# Patient Record
(demographics unavailable — no encounter records)

---

## 2024-10-08 NOTE — HEALTH RISK ASSESSMENT
[Yes] : Yes [Monthly or less (1 pt)] : Monthly or less (1 point) [1 or 2 (0 pts)] : 1 or 2 (0 points) [Never (0 pts)] : Never (0 points) [No] : In the past 12 months have you used drugs other than those required for medical reasons? No [0] : 2) Feeling down, depressed, or hopeless: Not at all (0) [PHQ-2 Negative - No further assessment needed] : PHQ-2 Negative - No further assessment needed [Never] : Never [NO] : No [Patient declined Low Dose CT Scan] : Patient declined Low Dose CT Scan [Patient declined Retinal Exam] : Patient declined Retinal Exam. [HIV test declined] : HIV test declined [Hepatitis C test declined] : Hepatitis C test declined [Audit-CScore] : 1 [[id number]] : 0

## 2024-10-08 NOTE — PHYSICAL EXAM
[No Acute Distress] : no acute distress [Well Nourished] : well nourished [Well Developed] : well developed [Well-Appearing] : well-appearing [Normal Sclera/Conjunctiva] : normal sclera/conjunctiva [PERRL] : pupils equal round and reactive to light [EOMI] : extraocular movements intact [Normal Outer Ear/Nose] : the outer ears and nose were normal in appearance [Normal Oropharynx] : the oropharynx was normal [No JVD] : no jugular venous distention [No Lymphadenopathy] : no lymphadenopathy [Supple] : supple [No Respiratory Distress] : no respiratory distress  [No Accessory Muscle Use] : no accessory muscle use [Clear to Auscultation] : lungs were clear to auscultation bilaterally [Normal Rate] : normal rate  [Regular Rhythm] : with a regular rhythm [Normal S1, S2] : normal S1 and S2 [No Carotid Bruits] : no carotid bruits [No Abdominal Bruit] : a ~M bruit was not heard ~T in the abdomen [No Varicosities] : no varicosities [No Edema] : there was no peripheral edema [No Palpable Aorta] : no palpable aorta [No Extremity Clubbing/Cyanosis] : no extremity clubbing/cyanosis [Soft] : abdomen soft [Non Tender] : non-tender [Non-distended] : non-distended [No HSM] : no HSM [Normal Bowel Sounds] : normal bowel sounds [No CVA Tenderness] : no CVA  tenderness [No Spinal Tenderness] : no spinal tenderness [No Joint Swelling] : no joint swelling [Grossly Normal Strength/Tone] : grossly normal strength/tone [No Rash] : no rash [Coordination Grossly Intact] : coordination grossly intact [No Focal Deficits] : no focal deficits [Normal Gait] : normal gait [Normal Affect] : the affect was normal [Normal Insight/Judgement] : insight and judgment were intact [de-identified] : right inguina hernia

## 2024-10-08 NOTE — PLAN
[FreeTextEntry1] : In regards to patients Physical exam, routine blood work drawn, will review results with patient.  Snoring/deviated septum- patient was referred to ENT  DDD cervical spine- patient will continue to see spine specialist   History of transaminitis- patient has no gone for US liver, will re-order test and call patient with results Patient will continue to follow up with Gastroenterologist.   Patient will follow with his cardiologist Dr. Glez.   Counseling included abnormal lab results, differential diagnoses, treatment options, risks and benefits, lifestyle changes, current condition, medications, and dose adjustments.  The patient was interactive, attentive, asked questions, and verbalized understanding

## 2024-10-08 NOTE — HISTORY OF PRESENT ILLNESS
[de-identified] : Mr. WEST MORFIN is a 41 year male with a PMH of abdominal hernia repair comes to the office for physical exam. Patient denies fever, cough SOB. No other complaints at this time.  [FreeTextEntry1] : physical exam

## 2024-10-14 NOTE — HISTORY OF PRESENT ILLNESS
[de-identified] : 41 year male comes in today with snoring.  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  Also has young children with poor sleeping schedules.  Wife observes loud snoring.  Patient fatigued for long time.  Congestion in nose, longstanding history of allergies, possible DNS.  Questionable improvement with flonase.  Denies epistaxis, sinus infections, sinus pressure.

## 2024-10-14 NOTE — PHYSICAL EXAM
[] : septum deviated to the left [de-identified] : ITH and congested nasal mucosa. [Midline] : trachea located in midline position [de-identified] : 1-2+ tonsils without erythema or exudates [Normal] : no neck adenopathy

## 2024-10-14 NOTE — CONSULT LETTER
[Dear  ___] : Dear  [unfilled], [Consult Letter:] : I had the pleasure of evaluating your patient, [unfilled]. [Please see my note below.] : Please see my note below. [Sincerely,] : Sincerely, [FreeTextEntry3] : Dennis Christianson MD Otolaryngology at [address], [address] [place] [address] Phone: [phone] Fax: [phone]

## 2024-10-14 NOTE — ASSESSMENT
[FreeTextEntry1] : Heavy snoring and EDS.  EDS clouded by many possible inputs.  Check PSG. Flonase and astelin for congestion.  Seek attention for epistaxis, nasal discharge, facial pain/pressure, fever, chills, headache.  Followup 2 months

## 2024-12-16 NOTE — PHYSICAL EXAM
[] : septum deviated to the right [de-identified] : ITH [Normal] : mucosa is normal [Midline] : trachea located in midline position [de-identified] : 2+ tonsils without erythema or exudates

## 2024-12-16 NOTE — HISTORY OF PRESENT ILLNESS
[de-identified] : Update: 12/16/2024: Doing slightly better from nose standpoint.  HAd PSG which showed moderate CHRISTIANA AHI of 25.  Not sure about any improvement in snoring, but still waking with dry mouth.   41 year male comes in today with snoring. 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 Also has young children with poor sleeping schedules. Wife observes loud snoring. Patient fatigued for long time. Congestion in nose, longstanding history of allergies, possible DNS. Questionable improvement with flonase. Denies epistaxis, sinus infections, sinus pressure.

## 2024-12-16 NOTE — ASSESSMENT
[FreeTextEntry1] : Increase frequency of Astelin to BID and divide Flonase from QD to BID. Already scheduled for Pulm Sleep consult for PAP. Will followup 1 month after starting PAP to optimize nose further. Seek attention for epistaxis, nasal discharge, facial pain/pressure, fever, chills, headache. Followup 2 months

## 2025-02-24 NOTE — HISTORY OF PRESENT ILLNESS
[No Pertinent Cardiac History] : no history of aortic stenosis, atrial fibrillation, coronary artery disease, recent myocardial infarction, or implantable device/pacemaker [No Pertinent Pulmonary History] : no history of asthma, COPD, sleep apnea, or smoking [No Adverse Anesthesia Reaction] : no adverse anesthesia reaction in self or family member [(Patient denies any chest pain, claudication, dyspnea on exertion, orthopnea, palpitations or syncope)] : Patient denies any chest pain, claudication, dyspnea on exertion, orthopnea, palpitations or syncope [Chronic Anticoagulation] : no chronic anticoagulation [Chronic Kidney Disease] : no chronic kidney disease [Diabetes] : no diabetes [FreeTextEntry1] : C5-C7 anterior cervical Decompression fusion. [FreeTextEntry2] : 2/27/25 [FreeTextEntry3] : Dr. Hoyt [FreeTextEntry4] : Mr. WEST MORFIN is a 41 year male comes to the office for preoperative evaluation. Patient denies fever, cough SOB. No other complaints at this time.

## 2025-02-24 NOTE — PLAN
[FreeTextEntry1] : Mr. WEST MORFIN is a 41 year male comes to the office for preoperative evaluation. Patient denies fever, cough SOB. No other complaints at this time.  Patient has greater than 4 METS, is a low- moderate perioperative risk for Major adverse cardiac event. ECG and blood work reviewed. Cardiac clearance reviewed. No further testing indicated at this time. Patient is medically optimized for this procedure.   Prior to appointment and during encounter with patient extensive medical records were reviewed including but not limited to, Hospital records, out patient records, laboratory data and microbiology data    Total encounter total time 30 mins >50% of time spent counseling/coordinating care  Counseling included abnormal lab results, differential diagnoses, treatment options, risks and benefits, lifestyle changes, current condition, medications, and dose adjustments.  The patient was interactive, attentive, asked questions, and verbalized understanding

## 2025-02-24 NOTE — ASSESSMENT
[Patient Optimized for Surgery] : Patient optimized for surgery [No Further Testing Recommended] : no further testing recommended [Modify anti-platelet treatment prior to procedure] : Modify anti-platelet treatment prior to procedure [As per surgery] : as per surgery [FreeTextEntry4] : Mr. WEST MORFIN is a 41 year male comes to the office for preoperative evaluation. Patient denies fever, cough SOB. No other complaints at this time.  Patient has greater than 4 METS, is a low- moderate perioperative risk for Major adverse cardiac event. ECG and blood work reviewed. Cardiac clearance reviewed. No further testing indicated at this time. Patient is medically optimized for this procedure.  [FreeTextEntry6] :  No NSAIDs or Aspirin 5 days prior to procedure